# Patient Record
Sex: FEMALE | Race: BLACK OR AFRICAN AMERICAN | Employment: UNEMPLOYED | URBAN - METROPOLITAN AREA
[De-identification: names, ages, dates, MRNs, and addresses within clinical notes are randomized per-mention and may not be internally consistent; named-entity substitution may affect disease eponyms.]

---

## 2022-03-17 ENCOUNTER — APPOINTMENT (OUTPATIENT)
Dept: GENERAL RADIOLOGY | Age: 57
End: 2022-03-17
Attending: EMERGENCY MEDICINE
Payer: MEDICARE

## 2022-03-17 ENCOUNTER — HOSPITAL ENCOUNTER (EMERGENCY)
Age: 57
Discharge: HOME | End: 2022-03-17
Attending: EMERGENCY MEDICINE
Payer: MEDICARE

## 2022-03-17 VITALS
SYSTOLIC BLOOD PRESSURE: 125 MMHG | WEIGHT: 155.42 LBS | RESPIRATION RATE: 16 BRPM | DIASTOLIC BLOOD PRESSURE: 77 MMHG | OXYGEN SATURATION: 98 % | TEMPERATURE: 98 F | HEART RATE: 83 BPM

## 2022-03-17 DIAGNOSIS — Z93.1 STATUS POST INSERTION OF PERCUTANEOUS ENDOSCOPIC GASTROSTOMY (PEG) TUBE (HCC): Primary | ICD-10-CM

## 2022-03-17 PROCEDURE — 75810000109 HC GASTRO TUBE CHANGE

## 2022-03-17 PROCEDURE — 77030018831 HC SOL IRR H20 BAXT -A

## 2022-03-17 PROCEDURE — 74011000636 HC RX REV CODE- 636: Performed by: EMERGENCY MEDICINE

## 2022-03-17 PROCEDURE — 99283 EMERGENCY DEPT VISIT LOW MDM: CPT

## 2022-03-17 PROCEDURE — 74018 RADEX ABDOMEN 1 VIEW: CPT

## 2022-03-17 RX ADMIN — IOHEXOL 10 ML: 240 INJECTION, SOLUTION INTRATHECAL; INTRAVASCULAR; INTRAVENOUS; ORAL at 09:33

## 2022-03-17 NOTE — ED NOTES
Pt arrives from [address] when staff noticed this morning that her PEG tube was out. Unknown how long PEG tube was out. Unsure of size of PEG tube.

## 2022-03-17 NOTE — ED PROVIDER NOTES
HPI   The patient is a 57-year-old black female with a history of aphasia and CVA in the past also type 2 diabetes and a PEG tube for nutrition who presents the emergency room after the PEG tube fell out at the nursing home. It was uncertain what size PEG tube she had there. Past Medical History:   Diagnosis Date    Aphasia     [facility]cerebral vascular [facility]) [facility] [address]     Diabetes ([facility] [address]     2    S/P percutaneous endoscopic gastrostomy (PEG) tube placement ([facility] [address] [facility]     Vascular dementia ([facility] [address]        History reviewed. No pertinent surgical history. History reviewed. No pertinent family history. Social History     Socioeconomic History    Marital status: UNKNOWN     Spouse name: Not on file    Number of children: Not on file    Years of education: Not on file    Highest education level: Not on file   Occupational History    Not on file   Tobacco Use    Smoking status: Unknown If Ever Smoked    Smokeless tobacco: Not on file   Substance and Sexual Activity    Alcohol use: Not on file    Drug use: Not on file    Sexual activity: Not on file   Other Topics Concern    Not on file   Social History Narrative    Not on file     Social Determinants of Health     Financial Resource Strain:     Difficulty of Paying Living Expenses: Not on file   Food Insecurity:     Worried About Running Out of Food in the Last Year: Not on file    Sharmaine of Food in the Last Year: Not on file   Transportation Needs:     Lack of Transportation (Medical): Not on file    Lack of Transportation (Non-Medical):  Not on file   Physical Activity:     Days of Exercise per Week: Not on file    Minutes of Exercise per Session: Not on file   Stress:     Feeling of Stress : Not on file   Social Connections:     Frequency of Communication with Friends and Family: Not on file    Frequency of Social Gatherings with Friends and Family: Not on file    Attends [employer] Services: Not on file   [employer] of Clubs or Organizations: Not on file    Attends Club or Organization Meetings: Not on file    Marital Status: Not on file   Intimate Partner Violence:     Fear of Current or Ex-Partner: Not on file    Emotionally Abused: Not on file    Physically Abused: Not on file    Sexually Abused: Not on file   Housing Stability:     Unable to Pay for Housing in the Last Year: Not on file    Number of [place] in the Last Year: Not on file    Unstable Housing in the Last Year: Not on file         ALLERGIES: Patient has no known allergies. Review of Systems   All other systems reviewed and are negative. Vitals:    03/17/22 0853   BP: (!) 130/100   Pulse: 83   Resp: 16   Temp: 98 °F (36.7 °C)   SpO2: 100%   Weight: 70.5 kg (155 lb 6.8 oz)            Physical Exam  Vitals reviewed. HENT:      Head: Normocephalic. Mouth/Throat:      Mouth: Mucous membranes are moist.   Eyes:      Pupils: Pupils are equal, round, and reactive to light. Cardiovascular:      Rate and Rhythm: Normal rate. Pulmonary:      Effort: Pulmonary effort is normal.   Abdominal:      General: Abdomen is flat. Comments: There is an obvious PEG tube stoma in mid abdomen with no tube at this time   Musculoskeletal:         General: Normal range of motion. Cervical back: Normal range of motion. Skin:     General: Skin is warm. Neurological:      Mental Status: She is alert. Mental status is at baseline.    Psychiatric:         Mood and Affect: Mood normal.          MDM       Procedures        Procedure note   with help from the RN an attempt was made to place an 25 and a 16 [place] PEG tube which was unsuccessful a 12 [place] PEG tube was then placed without difficulty and a sinogram was obtained which is pending